# Patient Record
Sex: MALE | Race: WHITE | NOT HISPANIC OR LATINO | Employment: STUDENT | URBAN - METROPOLITAN AREA
[De-identification: names, ages, dates, MRNs, and addresses within clinical notes are randomized per-mention and may not be internally consistent; named-entity substitution may affect disease eponyms.]

---
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
## 2025-06-24 ENCOUNTER — OFFICE VISIT (OUTPATIENT)
Dept: PRIMARY CARE CLINIC | Facility: CLINIC | Age: 25
End: 2025-06-24
Payer: COMMERCIAL

## 2025-06-24 VITALS
OXYGEN SATURATION: 98 % | HEIGHT: 70 IN | BODY MASS INDEX: 29.35 KG/M2 | WEIGHT: 205 LBS | SYSTOLIC BLOOD PRESSURE: 122 MMHG | DIASTOLIC BLOOD PRESSURE: 66 MMHG | HEART RATE: 92 BPM | TEMPERATURE: 99 F

## 2025-06-24 DIAGNOSIS — Z13.220 SCREENING FOR HYPERLIPIDEMIA: Primary | ICD-10-CM

## 2025-06-24 DIAGNOSIS — Z00.00 ENCOUNTER FOR PREVENTATIVE ADULT HEALTH CARE EXAMINATION: ICD-10-CM

## 2025-06-24 DIAGNOSIS — F10.90 ALCOHOL USE: ICD-10-CM

## 2025-06-24 DIAGNOSIS — E66.3 OVERWEIGHT (BMI 25.0-29.9): ICD-10-CM

## 2025-06-24 DIAGNOSIS — Z13.1 SCREENING FOR DIABETES MELLITUS: ICD-10-CM

## 2025-06-24 DIAGNOSIS — Z11.3 ROUTINE SCREENING FOR STI (SEXUALLY TRANSMITTED INFECTION): ICD-10-CM

## 2025-06-24 PROCEDURE — 99395 PREV VISIT EST AGE 18-39: CPT | Mod: S$GLB,,, | Performed by: STUDENT IN AN ORGANIZED HEALTH CARE EDUCATION/TRAINING PROGRAM

## 2025-06-24 PROCEDURE — 3008F BODY MASS INDEX DOCD: CPT | Mod: CPTII,S$GLB,, | Performed by: STUDENT IN AN ORGANIZED HEALTH CARE EDUCATION/TRAINING PROGRAM

## 2025-06-24 PROCEDURE — 3074F SYST BP LT 130 MM HG: CPT | Mod: CPTII,S$GLB,, | Performed by: STUDENT IN AN ORGANIZED HEALTH CARE EDUCATION/TRAINING PROGRAM

## 2025-06-24 PROCEDURE — 3078F DIAST BP <80 MM HG: CPT | Mod: CPTII,S$GLB,, | Performed by: STUDENT IN AN ORGANIZED HEALTH CARE EDUCATION/TRAINING PROGRAM

## 2025-06-24 PROCEDURE — 1159F MED LIST DOCD IN RCRD: CPT | Mod: CPTII,S$GLB,, | Performed by: STUDENT IN AN ORGANIZED HEALTH CARE EDUCATION/TRAINING PROGRAM

## 2025-06-24 PROCEDURE — 1160F RVW MEDS BY RX/DR IN RCRD: CPT | Mod: CPTII,S$GLB,, | Performed by: STUDENT IN AN ORGANIZED HEALTH CARE EDUCATION/TRAINING PROGRAM

## 2025-06-24 PROCEDURE — 99999 PR PBB SHADOW E&M-NEW PATIENT-LVL III: CPT | Mod: PBBFAC,,, | Performed by: STUDENT IN AN ORGANIZED HEALTH CARE EDUCATION/TRAINING PROGRAM

## 2025-06-24 RX ORDER — VENLAFAXINE HYDROCHLORIDE 37.5 MG/1
37.5 CAPSULE, EXTENDED RELEASE ORAL DAILY
COMMUNITY
Start: 2025-06-16

## 2025-06-24 RX ORDER — PROPRANOLOL HYDROCHLORIDE 20 MG/1
20 TABLET ORAL 2 TIMES DAILY
COMMUNITY
Start: 2025-05-28

## 2025-06-24 NOTE — PROGRESS NOTES
"Primary Care  Annual Office Visit - In Person  6/24/2025        Patient is a 24 y.o.   Nataly Lema  has no past medical history on file.    History of Present Illness    CHIEF COMPLAINT:  Patient presents today for concerns about gynecomastia     He reports significant weight gain of 35 lbs over the past two years.  He currently takes Effexor and Propranolol. He discontinued Seroquel, Quetiapine, Wellbutrin, Citalopram, and Abilify one month ago??  He expresses concerns about medication's potential impact on metabolism, specifically regarding possible development of gynecomastia.    He is a former smoker who consumes alcohol every other week, with more than 5 drinks per sitting on Friday or Saturday. He denies illicit drug use.    He denies family history of cancer.        Active Medications  Current Outpatient Medications   Medication Instructions    propranoloL (INDERAL) 20 mg, 2 times daily    venlafaxine (EFFEXOR-XR) 37.5 mg, Daily       Annual Review of Preventative Care    Health Maintenance Due   Topic Date Due    Hepatitis C Screening  Never done    Lipid Panel  Never done    HIV Screening  Never done    TETANUS VACCINE  10/10/2022    COVID-19 Vaccine (1 - 2024-25 season) Never done     Last PSA: No results found for: "PSA"  Diabetes Screening:  No results found for: "LABGLYC", "HEMOGLOBINA1", "HGBA1C"  BP Readings from Last 3 Encounters:   06/24/25 122/66   08/16/19 124/71 (75%, Z = 0.67 /  64%, Z = 0.36)*   06/24/19 133/78     *BP percentiles are based on the 2017 AAP Clinical Practice Guideline for boys     Wt Readings from Last 3 Encounters:   06/24/25 1049 93 kg (205 lb 0.4 oz)   08/16/19 0914 60.8 kg (134 lb) (26%, Z= -0.64)*   01/05/16 1519 60.8 kg (134 lb) (77%, Z= 0.72)*     * Growth percentiles are based on CDC (Boys, 2-20 Years) data.       Physical Exam  Vitals reviewed.   Constitutional:       General: He is not in acute distress.  Eyes:      Pupils: Pupils are equal, round, and reactive to " light.   Cardiovascular:      Rate and Rhythm: Normal rate and regular rhythm.      Pulses: Normal pulses.      Heart sounds: Normal heart sounds.   Pulmonary:      Effort: Pulmonary effort is normal.      Breath sounds: Normal breath sounds.   Chest:   Breasts:     Breasts are symmetrical.      Right: No swelling, mass or tenderness.      Left: No swelling, mass or tenderness.   Abdominal:      General: Abdomen is flat. Bowel sounds are normal.      Palpations: Abdomen is soft.   Musculoskeletal:      Right lower leg: No edema.      Left lower leg: No edema.                 Assessment & Plan      WEIGHT GAIN:   70 lb weight gain since 2019  Of the mentioned psychiatric medications Abilify is most likely to contribute to weight gain.  This medication was discontinued one month ago.  Patient does not have true gynecomastia on exam, breast tissue changes are more likely related to significant weight gain.     OVERWEIGHT BMI 25 - 29.9:   F/u Testosterone as requested by patient     ALCOHOL USE:  Patient reports consuming more than 5 alcoholic drinks in one sitting every other week, typically on Friday or Saturday.  Discussed the importance of limiting alcohol intake    MOOD DISORDER:   Patient followed by Psychiatry Dr. Renetta Fischer   Current medication regimen Effexor and propranolol    ENCOUNTER FOR PREVENTIVE CARE:  CBC  CMP  TSH    SCREENING HLD:  Lipid panel     SCREENING DM:  HbA1C    Orders Placed This Encounter    Lipid Panel    Hemoglobin A1C    CBC Without Differential    Comprehensive Metabolic Panel    TSH    Hepatitis C Antibody    HIV 1/2 Ag/Ab (4th Gen)    C. trachomatis/N. gonorrhoeae by AMP DNA [facility]; Urine    Treponema Pallidium Antibodies IgG, IgM    Testosterone                              Upcoming Scheduled Appointments and Follow Up:    Future Appointments   Date Time Provider Department Center   6/27/2025  8:30 AM LAB [facility] LAB [place]       Follow Up [facility]  (with who? when?): No follow-ups on file.        Extended Emergency Contact Information  Primary Emergency Contact: Uriel Lema  Address: [address] [place], [place] [address] [place] [address] of [facility]  Home Phone: [phone]  Mobile Phone: [phone]  Relation: Mother      Ann Winters MD   Internal Medicine  6/24/2025 - 11:20 AM    I spent a total of 20 minutes on the day of the visit.This includes face to face time and non-face to face time preparing to see the patient (eg, review of tests), obtaining and/or reviewing separately obtained history, documenting clinical information in the electronic or other health record, independently interpreting results and communicating results to the patient/family/caregiver, or care coordinator.    This note was generated with the assistance of ambient listening technology. Verbal consent was obtained by the patient and accompanying visitor(s) for the recording of patient appointment to facilitate this note. I attest to having reviewed and edited the generated note for accuracy, though some syntax or spelling errors may persist. Please contact the author of this note for any clarification.      While patients have the right to access their medical record, it is essential to recognize that progress notes primarily serve as a means of communication among healthcare professionals.

## 2025-06-24 NOTE — PATIENT INSTRUCTIONS
For weight loss - 90 -150 minutes/week of aerobic exercise at 65-75% maximal predicted heart rate or  minutes/week of resistance exercise   Try spin classes or biking because it is more gentle on your joints     No soda, sweet tea, juices or lemonade. All drinks should be 5 calories or less.     Food and Beverage Log:  Keep a log of all food and beverages that you consume.  Keeping track of calories will help you lose weight, because monitoring will help you become more aware of what you are eating and recognize your eating patterns.  Be mindful of your hunger level before eating and your satiety level after eating.  Just keeping records will help you make healthy changes in your diet and eat fewer calories.        Tips for keeping a calorie count:      Each day, aim for the daily calorie goal.      Record your food intake immediately after eating. If possible, look up calories before or immediately after eating.      Download an matilde like [employer], Lose It!, or [employer] (Code: 07953) To keep track of your calories.     Measuring foods (using measuring cups or spoons) will help you be more accurate with counting calories.      Keep a running calorie count throughout the day.      Count daily calories toward a weekly calorie total. If you eat too many calories one day, cut back slightly over the next few days to meet your weekly goal.         Meal Planning & Grocery Shopping     Meal planning builds the foundation for healthy eating. When you have structured ideas for healthy meals and foods available at home to prepare those meals, weight control becomes easier.  If only healthy foods are available at home, then you will be much more likely to eat healthy foods. And you will be less likely to go to a restaurant or  a fast food meal, which tend to be unhealthy and higher in calories than meals prepared at home.       Take 5-10 minutes each week to plan meals for the next 7 days.  Make a grocery  list based on the meal plan.     Grocery Shopping Tips:  Shop on a full stomach.  Schedule your shopping for times when you are most motivated and able to be disciplined about your purchases. For example, after a stressful day at work it may be difficult to make the healthiest choices. Shopping at other times, such as early in the morning or after dinner, may be easier.  Focus your shopping on the outside aisles of the store, which tend to contain more fresh foods and lower calorie foods. The inside aisles tend to have more processed foods.  Stick to your list. Avoid buying unhealthy items just because they are on sale.   Compare nutrition labels to check the number of calories and percentage of fat.        What to buy:     Vegetables  Fresh vegetables  Frozen vegetables with no sauce or added salt  Canned vegetables with no sauce or added salt     Protein  Lean meats, such as chicken and turkey  Limit red meats, such as beef to no more than 1x/week  Limit processed meats, such as cold cuts, watson, sausage, and hot dogs. Look for brands that have no nitrites and are minimally processed. Consider turkey sausage or turkey watson.  Fish and Shellfish  Eggs  Dried beans  Canned beans (reduced sodium)     Fat  Use healthy oils, such as olive oil or canola oil, for cooking, salad dressings, etc.  Unflavored nuts and seeds  Nut butters (no added sugar)     Dairy  Yogurt (no sugar added)  Cheese  Low-fat milk  Unsweetened nondairy milks (almond milk, soy milk, etc)     Fruit  Fresh Fruit  Frozen fruit with no added sugar  Canned fruit with no added sugar  Dried fruit with no added sugar  100% fruit juice     Whole Grains  Single ingredient grains, such as oats, quinoa, brown rice  Whole-wheat pasta  Sprouted whole-grain bread     What to avoid:  - Avoid fried foods  - Avoid foods with added sugar  - Avoid sugar-sweetened beverages  - Avoid ultra-processed foods                Copyright © 2011, [employer]. For more  information about The Healthy Eating Plate, please see The [employer] Source, Department of Nutrition, [employer], www.thenutritionsource.org, and [employer], www.health.[employer].edu.

## 2025-06-27 ENCOUNTER — LAB VISIT (OUTPATIENT)
Dept: LAB | Facility: HOSPITAL | Age: 25
End: 2025-06-27
Attending: STUDENT IN AN ORGANIZED HEALTH CARE EDUCATION/TRAINING PROGRAM
Payer: COMMERCIAL

## 2025-06-27 DIAGNOSIS — Z13.220 SCREENING FOR HYPERLIPIDEMIA: ICD-10-CM

## 2025-06-27 DIAGNOSIS — Z11.3 ROUTINE SCREENING FOR STI (SEXUALLY TRANSMITTED INFECTION): ICD-10-CM

## 2025-06-27 DIAGNOSIS — Z00.00 ENCOUNTER FOR PREVENTATIVE ADULT HEALTH CARE EXAMINATION: ICD-10-CM

## 2025-06-27 DIAGNOSIS — Z13.1 SCREENING FOR DIABETES MELLITUS: ICD-10-CM

## 2025-06-27 LAB
ALBUMIN SERPL BCP-MCNC: 4.4 G/DL (ref 3.5–5.2)
ALP SERPL-CCNC: 50 UNIT/L (ref 40–150)
ALT SERPL W/O P-5'-P-CCNC: 30 UNIT/L (ref 10–44)
ANION GAP (OHS): 10 MMOL/L (ref 8–16)
AST SERPL-CCNC: 20 UNIT/L (ref 11–45)
BILIRUB SERPL-MCNC: 1 MG/DL (ref 0.1–1)
BUN SERPL-MCNC: 15 MG/DL (ref 6–20)
CALCIUM SERPL-MCNC: 9.3 MG/DL (ref 8.7–10.5)
CHLORIDE SERPL-SCNC: 101 MMOL/L (ref 95–110)
CHOLEST SERPL-MCNC: 288 MG/DL (ref 120–199)
CHOLEST/HDLC SERPL: 4.5 {RATIO} (ref 2–5)
CO2 SERPL-SCNC: 27 MMOL/L (ref 23–29)
CREAT SERPL-MCNC: 1.6 MG/DL (ref 0.5–1.4)
EAG (OHS): 103 MG/DL (ref 68–131)
ERYTHROCYTE [DISTWIDTH] IN BLOOD BY AUTOMATED COUNT: 12.6 % (ref 11.5–14.5)
GFR SERPLBLD CREATININE-BSD FMLA CKD-EPI: >60 ML/MIN/1.73/M2
GLUCOSE SERPL-MCNC: 98 MG/DL (ref 70–110)
HBA1C MFR BLD: 5.2 % (ref 4–5.6)
HCT VFR BLD AUTO: 51.3 % (ref 40–54)
HCV AB SERPL QL IA: NORMAL
HDLC SERPL-MCNC: 64 MG/DL (ref 40–75)
HDLC SERPL: 22.2 % (ref 20–50)
HGB BLD-MCNC: 16.7 GM/DL (ref 14–18)
HIV 1+2 AB+HIV1 P24 AG SERPL QL IA: NORMAL
LDLC SERPL CALC-MCNC: 207.2 MG/DL (ref 63–159)
MCH RBC QN AUTO: 29.5 PG (ref 27–31)
MCHC RBC AUTO-ENTMCNC: 32.6 G/DL (ref 32–36)
MCV RBC AUTO: 91 FL (ref 82–98)
NONHDLC SERPL-MCNC: 224 MG/DL
PLATELET # BLD AUTO: 247 K/UL (ref 150–450)
PMV BLD AUTO: 9.8 FL (ref 9.2–12.9)
POTASSIUM SERPL-SCNC: 3.9 MMOL/L (ref 3.5–5.1)
PROT SERPL-MCNC: 7.5 GM/DL (ref 6–8.4)
RBC # BLD AUTO: 5.66 M/UL (ref 4.6–6.2)
SODIUM SERPL-SCNC: 138 MMOL/L (ref 136–145)
T PALLIDUM IGG+IGM SER QL: NORMAL
TESTOST SERPL-MCNC: 670 NG/DL (ref 304–1227)
TRIGL SERPL-MCNC: 84 MG/DL (ref 30–150)
TSH SERPL-ACNC: 1.11 UIU/ML (ref 0.4–4)
WBC # BLD AUTO: 3.83 K/UL (ref 3.9–12.7)

## 2025-06-27 PROCEDURE — 84075 ASSAY ALKALINE PHOSPHATASE: CPT

## 2025-06-27 PROCEDURE — 82465 ASSAY BLD/SERUM CHOLESTEROL: CPT

## 2025-06-27 PROCEDURE — 86803 HEPATITIS C AB TEST: CPT

## 2025-06-27 PROCEDURE — 86593 SYPHILIS TEST NON-TREP QUANT: CPT

## 2025-06-27 PROCEDURE — 84443 ASSAY THYROID STIM HORMONE: CPT

## 2025-06-27 PROCEDURE — 87389 HIV-1 AG W/HIV-1&-2 AB AG IA: CPT

## 2025-06-27 PROCEDURE — 83036 HEMOGLOBIN GLYCOSYLATED A1C: CPT

## 2025-06-27 PROCEDURE — 87491 CHLMYD TRACH DNA AMP PROBE: CPT

## 2025-06-27 PROCEDURE — 36415 COLL VENOUS BLD VENIPUNCTURE: CPT | Mod: PN

## 2025-06-27 PROCEDURE — 84403 ASSAY OF TOTAL TESTOSTERONE: CPT

## 2025-06-27 PROCEDURE — 85027 COMPLETE CBC AUTOMATED: CPT

## 2025-06-30 ENCOUNTER — E-VISIT (OUTPATIENT)
Dept: PRIMARY CARE CLINIC | Facility: CLINIC | Age: 25
End: 2025-06-30
Payer: COMMERCIAL

## 2025-06-30 DIAGNOSIS — Z01.89 PATIENT REQUEST FOR DIAGNOSTIC TESTING: Primary | ICD-10-CM

## 2025-07-02 NOTE — PROGRESS NOTES
Patient ID: Nataly Lema is a 24 y.o. male.    Chief Complaint: General Illness (Entered automatically based on patient selection in [facility].)    The patient initiated a request through [employer] on 6/30/2025 for evaluation and management with a chief complaint of General Illness (Entered automatically based on patient selection in [employer].)     I evaluated the questionnaire submission on 7/2/2025.    [facility] E[facility]-Common Problems    7/1/2025  2:39 PM CDT - Filed by Patient   What do you need help with? Other Concern   Do you agree to participate in an E-Visit? Yes   If you have any of the following symptoms, please go to the nearest emergency room or call 911: I acknowledge   What is the main issue you would like addressed today? Add   Please describe your symptoms. Add   Where is your problem located? Add   On a scale of 1-10, where 10 is the worst you can imagine, how severe are your symptoms? (range: 1 - 10) 1   Have you had these symptoms before? No   How long have you been having these symptoms? (Just today, For a few days, For a week, For one to four weeks, For more than a month) Just today   What helps with your symptoms? Y   What makes your symptoms feel worse? Y   Are these symptoms related to a condition that you currently have? (Yes, No, Not sure) No   Please describe any probable cause for your symptoms. No   Provide any information you feel is important to your history not asked above No   Please attach any relevant images or files    Are you able to take your vitals? No         Encounter Diagnosis   Name Primary?    Patient request for diagnostic testing Yes        Orders Placed This Encounter   Procedures    Lipid Panel     Standing Status:   Future     Expected Date:   7/2/2025     Expiration Date:   8/31/2026    CBC Without Differential     Standing Status:   Future     Expected Date:   7/2/2025     Expiration Date:   8/31/2026    Comprehensive Metabolic Panel     Standing Status:    Future     Expected Date:   7/2/2025     Expiration Date:   9/30/2026    TSH     Standing Status:   Future     Expected Date:   7/2/2025     Expiration Date:   9/30/2026    T4, Free     Standing Status:   Future     Expected Date:   7/2/2025     Expiration Date:   9/30/2026     Send normal result to authorizing provider's In Basket if patient is active on MyChart::   Yes    Prolactin     Standing Status:   Future     Expected Date:   7/2/2025     Expiration Date:   9/30/2026     Send normal result to authorizing provider's In Basket if patient is active on MyChart::   Yes    Vitamin B12     Standing Status:   Future     Expected Date:   7/2/2025     Expiration Date:   9/30/2026     Send normal result to authorizing provider's In Basket if patient is active on MyChart::   Yes    Folate     Standing Status:   Future     Expected Date:   7/2/2025     Expiration Date:   9/30/2026     Send normal result to authorizing provider's In Basket if patient is active on MyChart::   Yes            No follow-ups on file.      E-Visit Time Tracking: 15 minutes

## 2025-07-03 LAB
C TRACH DNA SPEC QL NAA+PROBE: NOT DETECTED
CTGC SOURCE (OHS) ORD-325: NORMAL
N GONORRHOEA DNA UR QL NAA+PROBE: NOT DETECTED

## 2025-07-07 ENCOUNTER — RESULTS FOLLOW-UP (OUTPATIENT)
Dept: PRIMARY CARE CLINIC | Facility: CLINIC | Age: 25
End: 2025-07-07

## 2025-07-07 DIAGNOSIS — E78.2 MIXED HYPERLIPIDEMIA: Primary | ICD-10-CM
